# Patient Record
Sex: FEMALE | Race: WHITE | Employment: FULL TIME | URBAN - METROPOLITAN AREA
[De-identification: names, ages, dates, MRNs, and addresses within clinical notes are randomized per-mention and may not be internally consistent; named-entity substitution may affect disease eponyms.]

---

## 2017-01-23 ENCOUNTER — HOSPITAL ENCOUNTER (OUTPATIENT)
Dept: LAB | Age: 47
Discharge: HOME | End: 2017-01-23
Payer: COMMERCIAL

## 2017-01-23 ENCOUNTER — OFFICE VISIT (OUTPATIENT)
Dept: FAMILY MEDICINE CLINIC | Age: 47
End: 2017-01-23

## 2017-01-23 VITALS
TEMPERATURE: 96.6 F | HEIGHT: 65 IN | RESPIRATION RATE: 18 BRPM | OXYGEN SATURATION: 97 % | WEIGHT: 293 LBS | SYSTOLIC BLOOD PRESSURE: 160 MMHG | BODY MASS INDEX: 48.82 KG/M2 | DIASTOLIC BLOOD PRESSURE: 92 MMHG | HEART RATE: 75 BPM

## 2017-01-23 DIAGNOSIS — F41.1 GENERALIZED ANXIETY DISORDER: ICD-10-CM

## 2017-01-23 DIAGNOSIS — I10 HTN (HYPERTENSION), BENIGN: Primary | ICD-10-CM

## 2017-01-23 DIAGNOSIS — D22.9 ATYPICAL MOLE: ICD-10-CM

## 2017-01-23 LAB
AMPHET UR QL SCN: NEGATIVE
BARBITURATES UR QL SCN: NEGATIVE
BENZODIAZ UR QL: NEGATIVE
CANNABINOIDS UR QL SCN: NEGATIVE
COCAINE UR QL SCN: NEGATIVE
HDSCOM,HDSCOM: NORMAL
METHADONE UR QL: NEGATIVE
OPIATES UR QL: NEGATIVE
PCP UR QL: NEGATIVE

## 2017-01-23 PROCEDURE — 80307 DRUG TEST PRSMV CHEM ANLYZR: CPT | Performed by: FAMILY MEDICINE

## 2017-01-23 RX ORDER — IBUPROFEN 800 MG/1
800 TABLET ORAL
Qty: 60 TAB | Refills: 0 | Status: SHIPPED | OUTPATIENT
Start: 2017-01-23 | End: 2017-05-24 | Stop reason: SDUPTHER

## 2017-01-23 RX ORDER — LORAZEPAM 1 MG/1
1 TABLET ORAL DAILY
Qty: 60 TAB | Refills: 0 | Status: SHIPPED | OUTPATIENT
Start: 2017-01-23 | End: 2017-10-30 | Stop reason: SDUPTHER

## 2017-01-23 NOTE — PROGRESS NOTES
HISTORY OF PRESENT ILLNESS  Mortimer Fish is a 40 y.o. female. HPI Comments: Patient is here for a follow up on her hypertension. She has been taking her  Blood pressure medications and they are working well for her. She also mentions the level of stress  Has increased at work and she used to take lorazepam last year and as needed. She has brought in an old bottle from last year and she needs a refill today. She would also like a referral for a skin check due to having 3 moles. Hypertension    The history is provided by the patient. This is a chronic problem. The problem has not changed since onset. Pertinent negatives include no chest pain, no orthopnea, no palpitations, no anxiety, no confusion, no malaise/fatigue, no blurred vision, no headaches, no neck pain, no peripheral edema, no dizziness, no shortness of breath and no vomiting. Risk factors include hypertension, stress and family history. Skin Problem   The history is provided by the patient. This is a chronic problem. The problem occurs constantly. The problem has been gradually worsening. Pertinent negatives include no chest pain, no abdominal pain, no headaches and no shortness of breath. Nothing aggravates the symptoms. Nothing relieves the symptoms. She has tried nothing for the symptoms. Anxiety   The history is provided by the patient. This is a chronic problem. The problem occurs constantly. The problem has been gradually worsening. Pertinent negatives include no chest pain, no abdominal pain, no headaches and no shortness of breath. The symptoms are aggravated by stress. Nothing relieves the symptoms. Treatments tried: lorazepam. The treatment provided significant relief. Review of Systems   Constitutional: Negative for chills, fever and malaise/fatigue. HENT: Negative for congestion, ear discharge, ear pain and sore throat. Eyes: Negative for blurred vision, double vision, pain and discharge.    Respiratory: Negative for shortness of breath. Cardiovascular: Negative for chest pain, palpitations, orthopnea and leg swelling. Gastrointestinal: Negative for abdominal pain and vomiting. Musculoskeletal: Negative for joint pain and neck pain. Skin:        3 distinct moles   Neurological: Negative for dizziness and headaches. Psychiatric/Behavioral: Negative for confusion, depression, hallucinations, substance abuse and suicidal ideas. The patient is nervous/anxious. Visit Vitals    BP (!) 160/92    Pulse 75    Temp 96.6 °F (35.9 °C) (Oral)    Resp 18    Ht 5' 5\" (1.651 m)    Wt 302 lb (137 kg)    SpO2 97%    BMI 50.26 kg/m2       Physical Exam   Constitutional: She is oriented to person, place, and time. She appears well-developed and well-nourished. No distress. HENT:   Head: Normocephalic and atraumatic. Right Ear: External ear normal.   Left Ear: External ear normal.   Mouth/Throat: Oropharynx is clear and moist.   Eyes: EOM are normal. Pupils are equal, round, and reactive to light. No scleral icterus. Neck: Normal range of motion. No thyromegaly present. Cardiovascular: Normal rate and normal heart sounds. Pulmonary/Chest: Effort normal and breath sounds normal. No respiratory distress. She has no wheezes. Abdominal: Soft. Bowel sounds are normal. She exhibits no distension. There is no tenderness. Lymphadenopathy:     She has no cervical adenopathy. Neurological: She is alert and oriented to person, place, and time. Skin:   One raised mole on the right side of neck which is raised and discolored. 2 moles on the left side of stomach under the breast which are raised and discolored. Psychiatric: She has a normal mood and affect. ASSESSMENT and PLAN  Hypertension :  1) Goal blood pressure less than equal to 140/90 mmHg, goal BP can vary depending on risk factors as discussed.   2) Lifestyle modifications discussed with patient, low sodium <2 gm, low salt , DASH diet  3) Exercise for at least 30 min 3-5 times a week for goal BMI of less than or equal  To 25.  4) Continue current medications as prescribed. 5) Please begin medication as discussed for better blood pressure control, explained side effects and patient verbalized understanding. 6) Goal LDL<100.  7) Please monitor your blood pressure and keep a log to bring in with you at each visit. 8) Discussed risk factors with patient such as CAD, FAST of stroke symptoms, pt verbalizes understanding. 9) Please avoid smoking , alcohol and any illicit drug use if applicable to you. Anxiety:  1) Please do not take more xanax than the reccommended dose, explained side effects and patient verbalized understanding. 2)  registry consulted. 3) Urine drug screen ordered.     Moles :  - referral to dermatology has been made

## 2017-01-23 NOTE — PROGRESS NOTES
Chief Complaint   Patient presents with    Hypertension     follow up     . Demetria Sanderson 1. Have you been to the ER, urgent care clinic since your last visit? Hospitalized since your last visit? No    2. Have you seen or consulted any other health care providers outside of the [facility] since your last visit? Include any pap smears or colon screening.  No

## 2017-03-15 RX ORDER — METOPROLOL TARTRATE 50 MG/1
TABLET ORAL
Qty: 90 TAB | Refills: 0 | Status: SHIPPED | OUTPATIENT
Start: 2017-03-15 | End: 2017-08-30 | Stop reason: SDUPTHER

## 2017-03-15 NOTE — TELEPHONE ENCOUNTER
Requested Prescriptions     Pending Prescriptions Disp Refills    escitalopram oxalate (LEXAPRO) 20 mg tablet 30 Tab 3     Sig: Take 1 Tab by mouth daily. [employer] already in system.

## 2017-03-17 RX ORDER — ESCITALOPRAM OXALATE 20 MG/1
TABLET ORAL
Qty: 30 TAB | Refills: 2 | Status: SHIPPED | OUTPATIENT
Start: 2017-03-17 | End: 2017-05-24

## 2017-03-17 NOTE — TELEPHONE ENCOUNTER
Requested Prescriptions     Pending Prescriptions Disp Refills    escitalopram oxalate (LEXAPRO) 20 mg tablet [Pharmacy Med Name: ESCITALOPRAM 20 MG TABLET] 30 Tab 2     Sig: TAKE ONE TABLET BY MOUTH DAILY     Pt states called pharmacy for refill a couple of days ago. States does not have enough to get through the weekend.

## 2017-03-21 RX ORDER — ESCITALOPRAM OXALATE 20 MG/1
20 TABLET ORAL DAILY
Qty: 30 TAB | Refills: 3 | Status: SHIPPED | OUTPATIENT
Start: 2017-03-21 | End: 2017-10-12 | Stop reason: SDUPTHER

## 2017-05-24 ENCOUNTER — OFFICE VISIT (OUTPATIENT)
Dept: FAMILY MEDICINE CLINIC | Age: 47
End: 2017-05-24

## 2017-05-24 VITALS
HEIGHT: 65 IN | RESPIRATION RATE: 17 BRPM | WEIGHT: 293 LBS | SYSTOLIC BLOOD PRESSURE: 129 MMHG | TEMPERATURE: 98.8 F | DIASTOLIC BLOOD PRESSURE: 75 MMHG | BODY MASS INDEX: 48.82 KG/M2 | HEART RATE: 68 BPM | OXYGEN SATURATION: 96 %

## 2017-05-24 DIAGNOSIS — F41.1 GENERALIZED ANXIETY DISORDER: ICD-10-CM

## 2017-05-24 DIAGNOSIS — J01.00 ACUTE NON-RECURRENT MAXILLARY SINUSITIS: Primary | ICD-10-CM

## 2017-05-24 RX ORDER — BENZONATATE 100 MG/1
100 CAPSULE ORAL
Qty: 20 CAP | Refills: 0 | Status: SHIPPED | OUTPATIENT
Start: 2017-05-24 | End: 2017-05-31

## 2017-05-24 RX ORDER — IBUPROFEN 800 MG/1
800 TABLET ORAL
Qty: 60 TAB | Refills: 0 | Status: SHIPPED | OUTPATIENT
Start: 2017-05-24 | End: 2017-10-30 | Stop reason: SDUPTHER

## 2017-05-24 RX ORDER — LORAZEPAM 1 MG/1
1 TABLET ORAL DAILY
Qty: 60 TAB | Refills: 0 | Status: CANCELLED | OUTPATIENT
Start: 2017-05-24

## 2017-05-24 RX ORDER — GUAIFENESIN 600 MG/1
600 TABLET, EXTENDED RELEASE ORAL
Qty: 15 TAB | Refills: 0 | Status: SHIPPED | OUTPATIENT
Start: 2017-05-24 | End: 2017-06-30

## 2017-05-24 NOTE — PROGRESS NOTES
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 Yesterday morning developed post nasal drip, runny nose yellow, sinus congestion, productive cough,inflammed throat, uncomfortable swallowing, minimal sinus congestion. Ear fullness left started last night. Some watery eyes yesterday but none today. Possible fever yesterday, sweating. Chest tightness with deep breathing and some wheezing. Minimal shortness of breath. No GI symptoms including abdominal pain, nausea, vomiting or diarrhea. No  symptoms including dysuria, hematuria. Sick contacts at work. ROS: as per HPI. Past Medical Hx, Family Hx, Social Hx, Surgical Hx, Medications, Allergies: All reviewed and updated in EMR as appropriate. Current Outpatient Prescriptions   Medication Sig    ibuprofen (MOTRIN) 800 mg tablet Take 1 Tab by mouth every twelve (12) hours as needed for Pain.  benzonatate (TESSALON) 100 mg capsule Take 1 Cap by mouth three (3) times daily as needed for Cough for up to 7 days.  guaiFENesin ER (MUCINEX) 600 mg ER tablet Take 1 Tab by mouth two (2) times daily as needed for Congestion.  inhalational spacing device 1 Each by Does Not Apply route as needed.  escitalopram oxalate (LEXAPRO) 20 mg tablet Take 1 Tab by mouth daily.  metoprolol tartrate (LOPRESSOR) 50 mg tablet TAKE ONE TABLET BY MOUTH DAILY *GENERIC FOR LOPRESSOR*    LORazepam (ATIVAN) 1 mg tablet Take 1 Tab by mouth daily. Max Daily Amount: 1 mg.  lisinopril (PRINIVIL, ZESTRIL) 20 mg tablet Take  by mouth daily.  ferrous sulfate (IRON) 325 mg (65 mg iron) EC tablet Take 1 Tab by mouth two (2) times a day. No current facility-administered medications for this visit.         OBJECTIVE:  Vitals:    05/24/17 0953   BP: 129/75   Pulse: 68   Resp: 17   Temp: 98.8 °F (37.1 °C)   TempSrc: Oral   SpO2: 96%   Weight: 307 lb (139.3 kg)   Height: 5' 5\" (1.651 m)   General: Pleasant adult woman in mild distress  HEENT:    Head: atraumatic normo-cephalic. Eyes: Pupils equally round and reactive to light, extraocular muscle intact, conjunctiva clear, non-icterus. Ears: bilaterally normal TM, no erythema or exudate, normal light reflex. Nose: Nares bilaterally mucosal membranes mild erythema. Nasal turbinates hypertrophied and erythematous. clear nasal discharge. Sinuses: TTP left maxillary sinuses   Mouth: cobble stoning oropharynx  Neck: Supple, left superior cervical LAD  CVS:  Heart regular, rate, and rhythm. Audible S1 and S2. No murmurs, rubs or gallops. Peripheral pulses 2+, no LE edema. PULM: Lungs clear to auscultation bilaterally. No wheezes, rales or rhonchi. Neuro: Alert and oriented to person, place, time and situation. ASSESSMENT AND PLAN:    ICD-10-CM ICD-9-CM    1. Acute non-recurrent maxillary sinusitis J01.00 461.0 -- ibuprofen (MOTRIN) 800 mg tablet      -- benzonatate (TESSALON) 100 mg capsule      -- guaiFENesin ER (MUCINEX) 600 mg ER tablet  -- Pt to call if no sx improvement in 5 days for Rx for augmentin 875/125 BID for 7 days with food      -- inhalational spacing device with albuterol   2. Generalized anxiety disorder F41.1 300.02 -- msg sent to PCP for ativan refill     3. Orders Placed This Encounter    ibuprofen (MOTRIN) 800 mg tablet    benzonatate (TESSALON) 100 mg capsule    guaiFENesin ER (MUCINEX) 600 mg ER tablet    inhalational spacing device     4. After visit summary provided to patient    5. Assessment and plan above discussed with patient, patient voiced understanding and agreement with plan. Paul Galvez M.D.   [address], [address] [place] [address], [address] [place] [address] [phone]  [facility] [phone]

## 2017-05-24 NOTE — LETTER
NOTIFICATION RETURN TO WORK / SCHOOL 
 
5/24/2017 10:20 AM 
 
Ms. Rashi Pickens [address] [address] To Whom It May Concern: 
 
Rashi Pickens is currently under the care of [facility]. She has been ill and will return to work on: 5/26/2017 If there are questions or concerns please have the patient contact our office. Sincerely, Junior Nika MD

## 2017-05-24 NOTE — PATIENT INSTRUCTIONS
ACUTE SINUSITIS    Start taking mucinex as needed twice a day for congestoin  Try to use netti-pot or other sinus saline rinse once a day to clear out nasal/sinus congestion, available at local pharmacy  Tessalon by mouth as needed three times a day for coughing  Okay to use ibuprofen as needed for pain   This is most likely a viral infection. If your symptoms do not improve over the next 5 days or get worse by 5 days in call clinic for antibiotic prescription  Use your albuterol inhaler WITH spacer as needed for wheezing, shortness of breath or chest tightness      We will message Dr. John Gordon for your ativan prescription refill           Sinusitis: Care Instructions  Your Care Instructions    Sinusitis is an infection of the lining of the sinus cavities in your head. Sinusitis often follows a cold. It causes pain and pressure in your head and face. In most cases, sinusitis gets better on its own in 1 to 2 weeks. But some mild symptoms may last for several weeks. Sometimes antibiotics are needed. Follow-up care is a key part of your treatment and safety. Be sure to make and go to all appointments, and call your doctor if you are having problems. It's also a good idea to know your test results and keep a list of the medicines you take. How can you care for yourself at home? · Take an over-the-counter pain medicine, such as acetaminophen (Tylenol), ibuprofen (Advil, Motrin), or naproxen (Aleve). Read and follow all instructions on the label. · If the doctor prescribed antibiotics, take them as directed. Do not stop taking them just because you feel better. You need to take the full course of antibiotics. · Be careful when taking over-the-counter cold or flu medicines and Tylenol at the same time. Many of these medicines have acetaminophen, which is Tylenol. Read the labels to make sure that you are not taking more than the recommended dose. Too much acetaminophen (Tylenol) can be harmful.   · Breathe warm, moist air from a steamy shower, a hot bath, or a sink filled with hot water. Avoid cold, dry air. Using a humidifier in your home may help. Follow the directions for cleaning the machine. · Use saline (saltwater) nasal washes to help keep your nasal passages open and wash out mucus and bacteria. You can buy saline nose drops at a grocery store or drugstore. Or you can make your own at home by adding 1 teaspoon of salt and 1 teaspoon of baking soda to 2 cups of distilled water. If you make your own, fill a bulb syringe with the solution, insert the tip into your nostril, and squeeze gently. Monico [place] your nose. · Put a hot, wet towel or a warm gel pack on your face 3 or 4 times a day for 5 to 10 minutes each time. · Try a decongestant nasal spray like oxymetazoline (Afrin). Do not use it for more than 3 days in a row. Using it for more than 3 days can make your congestion worse. When should you call for help? Call your doctor now or seek immediate medical care if:  · You have new or worse swelling or redness in your face or around your eyes. · You have a new or higher fever. Watch closely for changes in your health, and be sure to contact your doctor if:  · You have new or worse facial pain. · The mucus from your nose becomes thicker (like pus) or has new blood in it. · You are not getting better as expected. Where can you learn more? Go to http://tommie-jaxon.info/. Enter H610 in the search box to learn more about \"Sinusitis: Care Instructions. \"  Current as of: July 29, 2016  Content Version: 11.2  © 3035-7468 [employer]. Care instructions adapted under license by [employer] (which disclaims liability or warranty for this information). If you have questions about a medical condition or this instruction, always ask your healthcare professional. [address] any warranty or liability for your use of this information.

## 2017-05-24 NOTE — MR AVS SNAPSHOT
Visit Information Date & Time Provider Department Dept. Phone Encounter #  
 5/24/2017 10:00 AM Colton Mackey MD [facility] [phone] [id number] Follow-up Instructions Return if symptoms worsen or fail to improve. Upcoming Health Maintenance Date Due INFLUENZA AGE 9 TO ADULT 8/1/2017 PAP AKA CERVICAL CYTOLOGY 12/19/2019 DTaP/Tdap/Td series (2 - Td) 12/19/2026 Allergies as of 5/24/2017  Review Complete On: 5/24/2017 By: Colton Mackey MD  
  
 Severity Noted Reaction Type Reactions Darvocet A500 [Propoxyphene N-acetaminophen] High 03/25/2016    Itching Percocet [Oxycodone-acetaminophen] High 03/25/2016    Itching Current Immunizations  Never Reviewed No immunizations on file. Not reviewed this visit You Were Diagnosed With   
  
 Codes Comments Acute non-recurrent maxillary sinusitis    -  Primary ICD-10-CM: J01.00 ICD-9-CM: 461.0 Vitals BP Pulse Temp Resp Height(growth percentile) Weight(growth percentile) 129/75 (BP 1 Location: Left arm, BP Patient Position: Sitting) 68 98.8 °F (37.1 °C) (Oral) 17 5' 5\" (1.651 m) 307 lb (139.3 kg) SpO2 BMI OB Status Smoking Status 96% 51.09 kg/m2 Hysterectomy Former Smoker BMI and BSA Data Body Mass Index Body Surface Area 51.09 kg/m 2 2.53 m 2 Preferred Pharmacy Pharmacy Name Phone [facility] #[id number] [place] [phone] Your Updated Medication List  
  
   
This list is accurate as of: 5/24/17 10:17 AM.  Always use your most recent med list.  
  
  
  
  
 benzonatate 100 mg capsule Commonly known as:  TESSALON Take 1 Cap by mouth three (3) times daily as needed for Cough for up to 7 days. escitalopram oxalate 20 mg tablet Commonly known as:  Salli Becerra Take 1 Tab by mouth daily. ferrous sulfate 325 mg (65 mg iron) EC tablet Commonly known as:  IRON  
 Take 1 Tab by mouth two (2) times a day. guaiFENesin  mg ER tablet Commonly known as:  Jan & Jan Take 1 Tab by mouth two (2) times daily as needed for Congestion. ibuprofen 800 mg tablet Commonly known as:  MOTRIN Take 1 Tab by mouth every twelve (12) hours as needed for Pain.  
  
 inhalational spacing device 1 Each by Does Not Apply route as needed. lisinopril 20 mg tablet Commonly known as:  Salvadore Dine Take  by mouth daily. LORazepam 1 mg tablet Commonly known as:  ATIVAN Take 1 Tab by mouth daily. Max Daily Amount: 1 mg.  
  
 metoprolol tartrate 50 mg tablet Commonly known as:  LOPRESSOR  
TAKE ONE TABLET BY MOUTH DAILY *GENERIC FOR LOPRESSOR* Prescriptions Sent to Pharmacy Refills  
 ibuprofen (MOTRIN) 800 mg tablet 0 Sig: Take 1 Tab by mouth every twelve (12) hours as needed for Pain. Class: Normal  
 Pharmacy: [facility] #[id number] [address] Ph #: [phone] Route: Oral  
 benzonatate (TESSALON) 100 mg capsule 0 Sig: Take 1 Cap by mouth three (3) times daily as needed for Cough for up to 7 days. Class: Normal  
 Pharmacy: [facility] #[id number] [address] Ph #: [phone] Route: Oral  
 guaiFENesin ER (MUCINEX) 600 mg ER tablet 0 Sig: Take 1 Tab by mouth two (2) times daily as needed for Congestion. Class: Normal  
 Pharmacy: [facility] #[id number] [address] Ph #: [phone] Route: Oral  
 inhalational spacing device 0 Si Each by Does Not Apply route as needed. Class: Normal  
 Pharmacy: [facility] #[id number] [address] Ph #: [phone] Route: Does Not Apply Follow-up Instructions Return if symptoms worsen or fail to improve. Patient Instructions ACUTE SINUSITIS Start taking mucinex as needed twice a day for congestoin Try to use netti-pot or other sinus saline rinse once a day to clear out nasal/sinus congestion, available at local pharmacy Tessalon by mouth as needed three times a day for coughing Okay to use ibuprofen as needed for pain This is most likely a viral infection. If your symptoms do not improve over the next 5 days or get worse by 5 days in call clinic for antibiotic prescription Use your albuterol inhaler WITH spacer as needed for wheezing, shortness of breath or chest tightness We will message Dr. Iveth Augustin for your ativan prescription refill Sinusitis: Care Instructions Your Care Instructions Sinusitis is an infection of the lining of the sinus cavities in your head. Sinusitis often follows a cold. It causes pain and pressure in your head and face. In most cases, sinusitis gets better on its own in 1 to 2 weeks. But some mild symptoms may last for several weeks. Sometimes antibiotics are needed. Follow-up care is a key part of your treatment and safety. Be sure to make and go to all appointments, and call your doctor if you are having problems. It's also a good idea to know your test results and keep a list of the medicines you take. How can you care for yourself at home? · Take an over-the-counter pain medicine, such as acetaminophen (Tylenol), ibuprofen (Advil, Motrin), or naproxen (Aleve). Read and follow all instructions on the label. · If the doctor prescribed antibiotics, take them as directed. Do not stop taking them just because you feel better. You need to take the full course of antibiotics. · Be careful when taking over-the-counter cold or flu medicines and Tylenol at the same time. Many of these medicines have acetaminophen, which is Tylenol. Read the labels to make sure that you are not taking more than the recommended dose. Too much acetaminophen (Tylenol) can be harmful.  
· Breathe warm, moist air from a steamy shower, a hot bath, or a sink filled with hot water. Avoid cold, dry air. Using a humidifier in your home may help. Follow the directions for cleaning the machine. · Use saline (saltwater) nasal washes to help keep your nasal passages open and wash out mucus and bacteria. You can buy saline nose drops at a grocery store or drugstore. Or you can make your own at home by adding 1 teaspoon of salt and 1 teaspoon of baking soda to 2 cups of distilled water. If you make your own, fill a bulb syringe with the solution, insert the tip into your nostril, and squeeze gently. Yen Chyle your nose. · Put a hot, wet towel or a warm gel pack on your face 3 or 4 times a day for 5 to 10 minutes each time. · Try a decongestant nasal spray like oxymetazoline (Afrin). Do not use it for more than 3 days in a row. Using it for more than 3 days can make your congestion worse. When should you call for help? Call your doctor now or seek immediate medical care if: 
· You have new or worse swelling or redness in your face or around your eyes. · You have a new or higher fever. Watch closely for changes in your health, and be sure to contact your doctor if: 
· You have new or worse facial pain. · The mucus from your nose becomes thicker (like pus) or has new blood in it. · You are not getting better as expected. Where can you learn more? Go to http://tommie-jaxon.info/. Enter R643 in the search box to learn more about \"Sinusitis: Care Instructions. \" Current as of: July 29, 2016 Content Version: 11.2 © 4531-2081 [employer]. Care instructions adapted under license by [employer] (which disclaims liability or warranty for this information). If you have questions about a medical condition or this instruction, always ask your healthcare professional. [address] any warranty or liability for your use of this information. Introducing [facility]! Prateek Andrews introduces [employer] patient portal. Now you can access parts of your medical record, email your doctor's office, and request medication refills online. 1. In your internet browser, go to [email or web address] 2. Click on the First Time User? Click Here link in the Sign In box. You will see the New Member Sign Up page. 3. Enter your [employer] Access Code exactly as it appears below. You will not need to use this code after youve completed the sign-up process. If you do not sign up before the expiration date, you must request a new code. · [employer] Access Code: [email or web address] Expires: 8/22/2017 10:17 AM 
 
4. Enter the last four digits of your Social Security Number (xxxx) and Date of Birth (mm/dd/yyyy) as indicated and click Submit. You will be taken to the next sign-up page. 5. Create a [employer] ID. This will be your [employer] login ID and cannot be changed, so think of one that is secure and easy to remember. 6. Create a [employer] password. You can change your password at any time. 7. Enter your Password Reset Question and Answer. This can be used at a later time if you forget your password. 8. Enter your e-mail address. You will receive e-mail notification when new information is available in [address] 9. Click Sign Up. You can now view and download portions of your medical record. 10. Click the Download Summary menu link to download a portable copy of your medical information. If you have questions, please visit the Frequently Asked Questions section of the [employer] website. Remember, [employer] is NOT to be used for urgent needs. For medical emergencies, dial 911. Now available from your iPhone and Android! Please provide this summary of care documentation to your next provider. Your primary care clinician is listed as Samira [facility]. If you have any questions after today's visit, please call [phone].

## 2017-05-24 NOTE — PROGRESS NOTES
Sofi Martinez is a 40 y.o. female presents to office for coughing and left ear feels full for about a day. 1. Have you been to the ER, urgent care clinic or hospitalized since your last visit? no          Health Maintenance items with a due date reviewed with patient:  There are no preventive care reminders to display for this patient.

## 2017-05-30 ENCOUNTER — TELEPHONE (OUTPATIENT)
Dept: FAMILY MEDICINE CLINIC | Age: 47
End: 2017-05-30

## 2017-05-30 DIAGNOSIS — J01.90 ACUTE BACTERIAL SINUSITIS: Primary | ICD-10-CM

## 2017-05-30 DIAGNOSIS — B96.89 ACUTE BACTERIAL SINUSITIS: Primary | ICD-10-CM

## 2017-05-30 RX ORDER — AMOXICILLIN AND CLAVULANATE POTASSIUM 875; 125 MG/1; MG/1
1 TABLET, FILM COATED ORAL EVERY 12 HOURS
Qty: 14 TAB | Refills: 0 | Status: SHIPPED | OUTPATIENT
Start: 2017-05-30 | End: 2017-06-06

## 2017-05-30 NOTE — TELEPHONE ENCOUNTER
Called patient to discuss symptoms. No answer and unable to leave message on voicemail. Awaiting call back. When patient calls back please direct to MALGORZATA Wall to inform patient that augmentin was prescribed to take for 7 days as written    Acute Bacterial Sinusitis: No improvement after 5 days  -- Augmentin 875/125 mg BID x 7 days sent to [facility] on file  -- Return if symptoms do not improve with the above      Celester Narendra PATEL   [employer]  [address] [place] [address], [address] [place] [address] 066 [address] [place] [address] [phone]

## 2017-06-30 ENCOUNTER — OFFICE VISIT (OUTPATIENT)
Dept: FAMILY MEDICINE CLINIC | Age: 47
End: 2017-06-30

## 2017-06-30 VITALS
HEART RATE: 75 BPM | TEMPERATURE: 95.8 F | SYSTOLIC BLOOD PRESSURE: 161 MMHG | WEIGHT: 293 LBS | OXYGEN SATURATION: 95 % | DIASTOLIC BLOOD PRESSURE: 95 MMHG | RESPIRATION RATE: 16 BRPM | HEIGHT: 65 IN | BODY MASS INDEX: 48.82 KG/M2

## 2017-06-30 DIAGNOSIS — I10 HTN (HYPERTENSION), BENIGN: ICD-10-CM

## 2017-06-30 DIAGNOSIS — F41.1 GENERALIZED ANXIETY DISORDER: Primary | ICD-10-CM

## 2017-06-30 DIAGNOSIS — F41.0 PANIC ATTACKS: ICD-10-CM

## 2017-06-30 RX ORDER — LORAZEPAM 1 MG/1
1 TABLET ORAL
Qty: 15 TAB | Refills: 0 | Status: SHIPPED | OUTPATIENT
Start: 2017-06-30 | End: 2017-10-30 | Stop reason: SDUPTHER

## 2017-06-30 NOTE — MR AVS SNAPSHOT
Visit Information Date & Time Provider Department Dept. Phone Encounter #  
 6/30/2017  4:15 PM Jackquelyn Mcburney, MD [address] [phone] [id number] Upcoming Health Maintenance Date Due INFLUENZA AGE 9 TO ADULT 8/1/2017 PAP AKA CERVICAL CYTOLOGY 12/19/2019 DTaP/Tdap/Td series (2 - Td) 12/19/2026 Allergies as of 6/30/2017  Review Complete On: 6/30/2017 By: Neil Willis LPN Severity Noted Reaction Type Reactions Darvocet A500 [Propoxyphene N-acetaminophen] High 03/25/2016    Itching Percocet [Oxycodone-acetaminophen] High 03/25/2016    Itching Current Immunizations  Never Reviewed No immunizations on file. Not reviewed this visit You Were Diagnosed With   
  
 Codes Comments Generalized anxiety disorder    -  Primary ICD-10-CM: F41.1 ICD-9-CM: 300.02 Panic attacks     ICD-10-CM: F41.0 ICD-9-CM: 300.01 Vitals BP Pulse Temp Resp Height(growth percentile) Weight(growth percentile) (!) 161/95 75 95.8 °F (35.4 °C) 16 5' 5\" (1.651 m) 309 lb (140.2 kg) SpO2 BMI OB Status Smoking Status 95% 51.42 kg/m2 Hysterectomy Former Smoker Vitals History BMI and BSA Data Body Mass Index Body Surface Area  
 51.42 kg/m 2 2.54 m 2 Preferred Pharmacy Pharmacy Name Phone [facility] #[id number] Bailee Kaur, [address] [place] [address] [phone] Your Updated Medication List  
  
   
This list is accurate as of: 6/30/17  4:29 PM.  Always use your most recent med list.  
  
  
  
  
 escitalopram oxalate 20 mg tablet Commonly known as:  Gavin Hane Take 1 Tab by mouth daily. ferrous sulfate 325 mg (65 mg iron) EC tablet Commonly known as:  IRON Take 1 Tab by mouth two (2) times a day. ibuprofen 800 mg tablet Commonly known as:  MOTRIN Take 1 Tab by mouth every twelve (12) hours as needed for Pain. lisinopril 20 mg tablet Commonly known as:  Flaca Barrier Take  by mouth daily. * LORazepam 1 mg tablet Commonly known as:  ATIVAN Take 1 Tab by mouth daily. Max Daily Amount: 1 mg.  
  
 * LORazepam 1 mg tablet Commonly known as:  ATIVAN Take 1 Tab by mouth daily as needed for Anxiety. metoprolol tartrate 50 mg tablet Commonly known as:  LOPRESSOR  
TAKE ONE TABLET BY MOUTH DAILY *GENERIC FOR LOPRESSOR*  
  
 * Notice: This list has 2 medication(s) that are the same as other medications prescribed for you. Read the directions carefully, and ask your doctor or other care provider to review them with you. Prescriptions Printed Refills LORazepam (ATIVAN) 1 mg tablet 0 Sig: Take 1 Tab by mouth daily as needed for Anxiety. Class: Print Route: Oral  
  
Patient Instructions Take ativan as needed for panic Consider getting \"when panic attacks\" by Dr. Eduardo Black as an introduction to CBT (cognitive behavioral therapy) If this fits with your symptoms please strongly consider starting CBT therapy for anxiety Introducing [facility] SERVICES! Mehdi Recinos introduces [employer] patient portal. Now you can access parts of your medical record, email your doctor's office, and request medication refills online. 1. In your internet browser, go to [email or web address] 2. Click on the First Time User? Click Here link in the Sign In box. You will see the New Member Sign Up page. 3. Enter your [employer] Access Code exactly as it appears below. You will not need to use this code after youve completed the sign-up process. If you do not sign up before the expiration date, you must request a new code. · [employer] Access Code: [email or web address] Expires: 8/22/2017 10:17 AM 
 
4. Enter the last four digits of your Social Security Number (xxxx) and Date of Birth (mm/dd/yyyy) as indicated and click Submit. You will be taken to the next sign-up page. 5. Create a [employer] ID. This will be your [employer] login ID and cannot be changed, so think of one that is secure and easy to remember. 6. Create a [employer] password. You can change your password at any time. 7. Enter your Password Reset Question and Answer. This can be used at a later time if you forget your password. 8. Enter your e-mail address. You will receive e-mail notification when new information is available in [address] 9. Click Sign Up. You can now view and download portions of your medical record. 10. Click the Download Summary menu link to download a portable copy of your medical information. If you have questions, please visit the Frequently Asked Questions section of the [employer] website. Remember, [employer] is NOT to be used for urgent needs. For medical emergencies, dial 911. Now available from your iPhone and Android! Please provide this summary of care documentation to your next provider. Your primary care clinician is listed as Samira Gordon. If you have any questions after today's visit, please call [phone].

## 2017-06-30 NOTE — PATIENT INSTRUCTIONS
Take ativan as needed for panic  Consider getting \"when panic attacks\" by Dr. Margaretha Dubin as an introduction to CBT (cognitive behavioral therapy)  If this fits with your symptoms please strongly consider starting CBT therapy for anxiety

## 2017-06-30 NOTE — PROGRESS NOTES
Internal Medicine Follow Up Visit Note    Chief Complaint   Patient presents with    Anxiety    Medication Refill       HPI:  Lianet Le is a 40 y.o.  female with history significant for HTN and generalized anxiety is here for the above complaint(s). Anxiety: Has a chronic history of anxiety for past 20 years. Has difficulty controlling worry and relaxing. Panic attacks at home typically associated with stress, severe 5x per year. Last panic attack 2 days ago, in past month 2x. Panic includes getting warm, hot, shaking, shortness of breath, instant headaches and paralysis in legs. No intense sense of fear, no desire to escape, no worry that something bad is about to happened, typically lasting 6-7 minutes. Some irritability, restlessness. Medication trials include ativan, nothing else. Years ago had talk therapy in 25s, 2 years once a week talk therapy. Lexapro 20mg daily for past 7 years, helpful with anxiety. Past history of paxil which was helpful, stopped 2/2 MD elizabeth. HTN: No chest pain or shortness of breath. Took Rx this morning lisinopril and metoprolol 50 mg. Encouraged to discuss with PCP regarding Rx. ROS: See HPI      Current Outpatient Prescriptions   Medication Sig    LORazepam (ATIVAN) 1 mg tablet Take 1 Tab by mouth daily as needed for Anxiety.  ibuprofen (MOTRIN) 800 mg tablet Take 1 Tab by mouth every twelve (12) hours as needed for Pain.  escitalopram oxalate (LEXAPRO) 20 mg tablet Take 1 Tab by mouth daily.  metoprolol tartrate (LOPRESSOR) 50 mg tablet TAKE ONE TABLET BY MOUTH DAILY *GENERIC FOR LOPRESSOR*    LORazepam (ATIVAN) 1 mg tablet Take 1 Tab by mouth daily. Max Daily Amount: 1 mg.  ferrous sulfate (IRON) 325 mg (65 mg iron) EC tablet Take 1 Tab by mouth two (2) times a day.  lisinopril (PRINIVIL, ZESTRIL) 20 mg tablet Take  by mouth daily. No current facility-administered medications for this visit.       Health Maintenance   Topic Date Due  INFLUENZA AGE 9 TO ADULT  08/01/2017    PAP AKA CERVICAL CYTOLOGY  12/19/2019    DTaP/Tdap/Td series (2 - Td) 12/19/2026       Allergies and Medications: Reviewed and updated in EMR. Patient Active Problem List    Diagnosis Date Noted    Vitamin D deficiency 12/22/2016    Iron deficiency anemia due to chronic blood loss 12/22/2016    Cyst of right breast 12/19/2016    S/P total hysterectomy 11/21/2016    Advance care planning 03/25/2016    HTN (hypertension), benign 03/25/2016    Obesity 03/25/2016    Generalized anxiety disorder 03/25/2016       Family History, Social History, Past Medical History, Surgical History: Reviewed and updated in EMR as appropriate. OBJECTIVE:   Visit Vitals    BP (!) 161/95    Pulse 75    Temp 95.8 °F (35.4 °C)    Resp 16    Ht 5' 5\" (1.651 m)    Wt 309 lb (140.2 kg)    SpO2 95%  Comment: ra    BMI 51.42 kg/m2   General: Adult woman, in no acute distress  MSE: Appears stated age, dressed casually, good grooming and hygiene. Speech normal volume, rate and tone. Mood anxious with underlying irritability, affect congruent and reactive. TP linear content focused on HPI. Cognition grossly intact, not formally assessed. Fair insight and judgment. No SI/HI. PHQ-9: 5, NOT DIFFICULT  RICHARD-7: 10, SOMEWHAT DIFFICULT    Nursing Notes Reviewed. ASSESSMENT/PLAN:      ICD-10-CM ICD-9-CM    1. Generalized anxiety disorder F41.1 300.02 LORazepam (ATIVAN) 1 mg tablet PRN # 15 tabs  Continue lexapro, consider alternative SSRI  Consider therapy  Encouraged to get \"When Panic Attacks\" by Dr. Melvin Terrazas for introduction to cognitive behavioral therapy  Encouraged to discuss further refills with PCP   2. Panic attacks F41.0 300.01 LORazepam (ATIVAN) 1 mg tablet prn, # 15 tabs   3.  HTN (hypertension), benign I10 401.1 Encouraged to discuss with PCP       Requested Prescriptions     Signed Prescriptions Disp Refills    LORazepam (ATIVAN) 1 mg tablet 15 Tab 0     Sig: Take 1 Tab by mouth daily as needed for Anxiety. Patient verbalized understanding and agreement with the plan. Patient was given an after-visit summary. Follow-up Disposition: Not on File or sooner if worsening symptoms. More than 50% of this 20 min visit was spent counseling the patient face to face about anxiety management      John Woods M.D.   [place] [phone]  ZJ -

## 2017-08-30 RX ORDER — METOPROLOL TARTRATE 50 MG/1
TABLET ORAL
Qty: 90 TAB | Refills: 0 | Status: SHIPPED | OUTPATIENT
Start: 2017-08-30 | End: 2018-03-07 | Stop reason: SDUPTHER

## 2017-10-16 RX ORDER — ESCITALOPRAM OXALATE 20 MG/1
TABLET ORAL
Qty: 30 TAB | Refills: 2 | Status: SHIPPED | OUTPATIENT
Start: 2017-10-16 | End: 2018-01-10 | Stop reason: SDUPTHER

## 2017-10-16 NOTE — TELEPHONE ENCOUNTER
Pt called and said she is very irritated that she has not received her medication. Pt stated the pharmacy has sent numerous fax request and she would like to  today. Pt was advised that the request documented is for last Thursday and contingent upon receipt of request it takes 48 to 72 hours.

## 2017-10-30 ENCOUNTER — OFFICE VISIT (OUTPATIENT)
Dept: FAMILY MEDICINE CLINIC | Age: 47
End: 2017-10-30

## 2017-10-30 VITALS
BODY MASS INDEX: 48.82 KG/M2 | TEMPERATURE: 97.6 F | WEIGHT: 293 LBS | OXYGEN SATURATION: 96 % | DIASTOLIC BLOOD PRESSURE: 97 MMHG | SYSTOLIC BLOOD PRESSURE: 163 MMHG | HEART RATE: 65 BPM | HEIGHT: 65 IN | RESPIRATION RATE: 18 BRPM

## 2017-10-30 DIAGNOSIS — Z76.0 MEDICATION REFILL: Primary | ICD-10-CM

## 2017-10-30 DIAGNOSIS — F41.1 GENERALIZED ANXIETY DISORDER: ICD-10-CM

## 2017-10-30 DIAGNOSIS — J01.00 ACUTE NON-RECURRENT MAXILLARY SINUSITIS: ICD-10-CM

## 2017-10-30 RX ORDER — LORAZEPAM 1 MG/1
1 TABLET ORAL DAILY
Qty: 60 TAB | Refills: 0 | Status: SHIPPED | OUTPATIENT
Start: 2017-10-30 | End: 2019-01-04 | Stop reason: SDUPTHER

## 2017-10-30 RX ORDER — IBUPROFEN 800 MG/1
800 TABLET ORAL
Qty: 60 TAB | Refills: 0 | Status: SHIPPED | OUTPATIENT
Start: 2017-10-30 | End: 2018-03-07 | Stop reason: SDUPTHER

## 2017-10-30 NOTE — PROGRESS NOTES
Chief Complaint   Patient presents with    Medication Refill    Anxiety       Pt preferred language for health care discussion is English. Is someone accompanying this pt? no    Is the patient using any DME equipment during OV? no    Depression Screening:  PHQ over the last two weeks 10/30/2017 5/24/2017 1/23/2017 12/19/2016 11/21/2016 3/25/2016   PHQ Not Done - - - - Medical Reason (indicate in comments) -   Little interest or pleasure in doing things Not at all Not at all Not at all Not at all - Not at all   Feeling down, depressed or hopeless Not at all Not at all Not at all Not at all - Not at all   Total Score PHQ 2 0 0 0 0 - 0       Learning Assessment:  Learning Assessment 1/23/2017 3/25/2016   PRIMARY LEARNER Patient Patient   HIGHEST LEVEL OF EDUCATION - PRIMARY LEARNER  SOME COLLEGE 2 YEARS [place] CAREGIVER No No   PRIMARY LANGUAGE ENGLISH ENGLISH   LEARNER PREFERENCE PRIMARY READING LISTENING   ANSWERED BY self patient   RELATIONSHIP SELF SELF       Abuse Screening:  No flowsheet data found. Health Maintenance reviewed and discussed per provider. Yes    Pt is due for Flu. Please order/place referral if appropriate. Pt currently taking Antiplatelet therapy? no      Advance Directive:  1. Do you have an advance directive in place? Patient Reply:no    2. If not, would you like material regarding how to put one in place? Patient Reply: no    Coordination of Care:  1. Have you been to the ER, urgent care clinic since your last visit? Hospitalized since your last visit? no    2. Have you seen or consulted any other health care providers outside of the [address] [place] [address] since your last visit? Include any pap smears or colon screening.  no

## 2017-10-30 NOTE — PROGRESS NOTES
HISTORY OF PRESENT ILLNESS  Giselle Coto is a 40 y.o. female. Anxiety   The history is provided by the patient. This is a recurrent problem. The problem occurs constantly. The problem has been gradually improving. Pertinent negatives include no chest pain, no abdominal pain, no headaches and no shortness of breath. The symptoms are aggravated by stress. The symptoms are relieved by medications. Treatments tried: ativan. The treatment provided moderate relief. Medication Refill   The history is provided by the patient. Pertinent negatives include no chest pain, no abdominal pain, no headaches and no shortness of breath. The symptoms are aggravated by stress. Nothing relieves the symptoms. She has tried nothing for the symptoms. Review of Systems   Constitutional: Negative for chills, fever and malaise/fatigue. HENT: Negative for ear pain, hearing loss, sinus pain and sore throat. Eyes: Negative for blurred vision, double vision, pain and discharge. Respiratory: Negative for cough, shortness of breath and wheezing. Cardiovascular: Negative for chest pain, palpitations and leg swelling. Gastrointestinal: Negative for abdominal pain, blood in stool, diarrhea, nausea and vomiting. Genitourinary: Negative for dysuria and frequency. Musculoskeletal: Negative for back pain, joint pain and myalgias. Neurological: Negative for tingling, focal weakness, weakness and headaches. Psychiatric/Behavioral: Negative for depression, substance abuse and suicidal ideas. The patient is nervous/anxious. Visit Vitals    BP (!) 163/97    Pulse 65    Temp 97.6 °F (36.4 °C) (Oral)    Resp 18    Ht 5' 5\" (1.651 m)    Wt 305 lb 3.2 oz (138.4 kg)    SpO2 96%    BMI 50.79 kg/m2       Physical Exam   Constitutional: She is oriented to person, place, and time. She appears well-developed and well-nourished. No distress. HENT:   Head: Normocephalic and atraumatic.    Right Ear: External ear normal. Left Ear: External ear normal.   Mouth/Throat: Oropharynx is clear and moist. No oropharyngeal exudate. Eyes: EOM are normal. Pupils are equal, round, and reactive to light. No scleral icterus. Neck: Normal range of motion. No thyromegaly present. Cardiovascular: Normal rate, regular rhythm and normal heart sounds. Pulmonary/Chest: Effort normal and breath sounds normal. No respiratory distress. She has no wheezes. Abdominal: Soft. Bowel sounds are normal. She exhibits no distension. There is no tenderness. Lymphadenopathy:     She has no cervical adenopathy. Neurological: She is alert and oriented to person, place, and time. Psychiatric: She has a normal mood and affect. ASSESSMENT and PLAN  Anxiety :  1) Please do not take more ativan than the reccommended dose, explained side effects and patient verbalized understanding. 2)  registry consulted. Medication refill:   Medications requested have been refilled.

## 2018-01-10 RX ORDER — ESCITALOPRAM OXALATE 20 MG/1
TABLET ORAL
Qty: 30 TAB | Refills: 2 | Status: SHIPPED | OUTPATIENT
Start: 2018-01-10 | End: 2018-03-07 | Stop reason: SDUPTHER

## 2018-01-10 NOTE — TELEPHONE ENCOUNTER
Requested Prescriptions     Pending Prescriptions Disp Refills    escitalopram oxalate (LEXAPRO) 20 mg tablet 30 Tab 2     Pt stated she is on her last tab.

## 2018-02-28 RX ORDER — LISINOPRIL 20 MG/1
TABLET ORAL
Qty: 90 TAB | Refills: 2 | Status: SHIPPED | OUTPATIENT
Start: 2018-02-28 | End: 2019-05-28 | Stop reason: SDUPTHER

## 2018-03-07 ENCOUNTER — OFFICE VISIT (OUTPATIENT)
Dept: FAMILY MEDICINE CLINIC | Age: 48
End: 2018-03-07

## 2018-03-07 ENCOUNTER — HOSPITAL ENCOUNTER (OUTPATIENT)
Dept: LAB | Age: 48
Discharge: HOME | End: 2018-03-07
Payer: COMMERCIAL

## 2018-03-07 VITALS
OXYGEN SATURATION: 98 % | DIASTOLIC BLOOD PRESSURE: 88 MMHG | HEART RATE: 59 BPM | RESPIRATION RATE: 18 BRPM | BODY MASS INDEX: 48.82 KG/M2 | SYSTOLIC BLOOD PRESSURE: 151 MMHG | HEIGHT: 65 IN | WEIGHT: 293 LBS | TEMPERATURE: 97.7 F

## 2018-03-07 DIAGNOSIS — J01.00 ACUTE NON-RECURRENT MAXILLARY SINUSITIS: ICD-10-CM

## 2018-03-07 DIAGNOSIS — I10 HTN (HYPERTENSION), BENIGN: ICD-10-CM

## 2018-03-07 DIAGNOSIS — I10 HTN (HYPERTENSION), BENIGN: Primary | ICD-10-CM

## 2018-03-07 DIAGNOSIS — F41.1 GENERALIZED ANXIETY DISORDER: ICD-10-CM

## 2018-03-07 DIAGNOSIS — Z76.0 MEDICATION REFILL: ICD-10-CM

## 2018-03-07 LAB
ALBUMIN SERPL-MCNC: 3.3 G/DL (ref 3.4–5)
ALBUMIN/GLOB SERPL: 0.9 {RATIO} (ref 0.8–1.7)
ALP SERPL-CCNC: 84 U/L (ref 45–117)
ALT SERPL-CCNC: 15 U/L (ref 13–56)
ANION GAP SERPL CALC-SCNC: 6 MMOL/L (ref 3–18)
AST SERPL-CCNC: 10 U/L (ref 15–37)
BASOPHILS # BLD: 0.1 K/UL (ref 0–0.06)
BASOPHILS NFR BLD: 1 % (ref 0–2)
BILIRUB SERPL-MCNC: 0.6 MG/DL (ref 0.2–1)
BUN SERPL-MCNC: 10 MG/DL (ref 7–18)
BUN/CREAT SERPL: 22 (ref 12–20)
CALCIUM SERPL-MCNC: 8.5 MG/DL (ref 8.5–10.1)
CHLORIDE SERPL-SCNC: 106 MMOL/L (ref 100–108)
CHOLEST SERPL-MCNC: 179 MG/DL
CO2 SERPL-SCNC: 26 MMOL/L (ref 21–32)
CREAT SERPL-MCNC: 0.46 MG/DL (ref 0.6–1.3)
DIFFERENTIAL METHOD BLD: ABNORMAL
EOSINOPHIL # BLD: 0.2 K/UL (ref 0–0.4)
EOSINOPHIL NFR BLD: 2 % (ref 0–5)
ERYTHROCYTE [DISTWIDTH] IN BLOOD BY AUTOMATED COUNT: 19.9 % (ref 11.6–14.5)
GLOBULIN SER CALC-MCNC: 3.5 G/DL (ref 2–4)
GLUCOSE SERPL-MCNC: 77 MG/DL (ref 74–99)
HCT VFR BLD AUTO: 34.9 % (ref 35–45)
HDLC SERPL-MCNC: 59 MG/DL (ref 40–60)
HDLC SERPL: 3 {RATIO} (ref 0–5)
HGB BLD-MCNC: 10.1 G/DL (ref 12–16)
LDLC SERPL CALC-MCNC: 90.6 MG/DL (ref 0–100)
LIPID PROFILE,FLP: NORMAL
LYMPHOCYTES # BLD: 2.2 K/UL (ref 0.9–3.6)
LYMPHOCYTES NFR BLD: 18 % (ref 21–52)
MCH RBC QN AUTO: 21.9 PG (ref 24–34)
MCHC RBC AUTO-ENTMCNC: 28.9 G/DL (ref 31–37)
MCV RBC AUTO: 75.5 FL (ref 74–97)
MONOCYTES # BLD: 1.4 K/UL (ref 0.05–1.2)
MONOCYTES NFR BLD: 12 % (ref 3–10)
NEUTS SEG # BLD: 8.2 K/UL (ref 1.8–8)
NEUTS SEG NFR BLD: 67 % (ref 40–73)
PLATELET # BLD AUTO: 343 K/UL (ref 135–420)
PMV BLD AUTO: 11.4 FL (ref 9.2–11.8)
POTASSIUM SERPL-SCNC: 4.6 MMOL/L (ref 3.5–5.5)
PROT SERPL-MCNC: 6.8 G/DL (ref 6.4–8.2)
RBC # BLD AUTO: 4.62 M/UL (ref 4.2–5.3)
SODIUM SERPL-SCNC: 138 MMOL/L (ref 136–145)
TRIGL SERPL-MCNC: 147 MG/DL (ref ?–150)
TSH SERPL DL<=0.05 MIU/L-ACNC: 2.12 UIU/ML (ref 0.36–3.74)
VLDLC SERPL CALC-MCNC: 29.4 MG/DL
WBC # BLD AUTO: 12.2 K/UL (ref 4.6–13.2)

## 2018-03-07 PROCEDURE — 80061 LIPID PANEL: CPT | Performed by: FAMILY MEDICINE

## 2018-03-07 PROCEDURE — 82306 VITAMIN D 25 HYDROXY: CPT | Performed by: FAMILY MEDICINE

## 2018-03-07 PROCEDURE — 84443 ASSAY THYROID STIM HORMONE: CPT | Performed by: FAMILY MEDICINE

## 2018-03-07 PROCEDURE — 36415 COLL VENOUS BLD VENIPUNCTURE: CPT | Performed by: FAMILY MEDICINE

## 2018-03-07 PROCEDURE — 85025 COMPLETE CBC W/AUTO DIFF WBC: CPT | Performed by: FAMILY MEDICINE

## 2018-03-07 PROCEDURE — 80053 COMPREHEN METABOLIC PANEL: CPT | Performed by: FAMILY MEDICINE

## 2018-03-07 RX ORDER — IBUPROFEN 800 MG/1
800 TABLET ORAL
Qty: 60 TAB | Refills: 0 | Status: SHIPPED | OUTPATIENT
Start: 2018-03-07 | End: 2018-08-06 | Stop reason: SDUPTHER

## 2018-03-07 RX ORDER — ESCITALOPRAM OXALATE 20 MG/1
TABLET ORAL
Qty: 30 TAB | Refills: 3 | Status: SHIPPED | OUTPATIENT
Start: 2018-03-07 | End: 2019-12-31 | Stop reason: SDUPTHER

## 2018-03-07 RX ORDER — METOPROLOL TARTRATE 50 MG/1
50 TABLET ORAL DAILY
Qty: 90 TAB | Refills: 3 | Status: SHIPPED | OUTPATIENT
Start: 2018-03-07

## 2018-03-07 NOTE — PROGRESS NOTES
HISTORY OF PRESENT ILLNESS  Artem Blackmon is a 56 y.o. female. Morbid Obesity   The history is provided by the patient. This is a chronic problem. The problem occurs constantly. The problem has been gradually worsening. Pertinent negatives include no chest pain, no abdominal pain, no headaches and no shortness of breath. The symptoms are aggravated by stress. Nothing relieves the symptoms. She has tried nothing for the symptoms. Hypertension    The history is provided by the patient. This is a chronic problem. The problem has been gradually worsening. Associated symptoms include anxiety. Pertinent negatives include no chest pain, no palpitations, no confusion, no malaise/fatigue, no blurred vision, no headaches, no tinnitus, no neck pain, no dizziness, no shortness of breath, no nausea and no vomiting. Risk factors include obesity, hypertension and stress. Anxiety   The history is provided by the patient. This is a chronic problem. The problem occurs constantly. The problem has not changed since onset. Pertinent negatives include no chest pain, no abdominal pain, no headaches and no shortness of breath. The symptoms are aggravated by stress. The symptoms are relieved by medications. Treatments tried: Jesusita Zimmer / Qiana Pickens. The treatment provided significant relief. Review of Systems   Constitutional: Negative for chills, fever and malaise/fatigue. HENT: Negative for congestion, ear pain, sinus pain, sore throat and tinnitus. Eyes: Negative for blurred vision, photophobia, pain and discharge. Respiratory: Negative for cough, sputum production and shortness of breath. Cardiovascular: Negative for chest pain, palpitations and leg swelling. Gastrointestinal: Negative for abdominal pain, blood in stool, diarrhea, nausea and vomiting. Genitourinary: Negative for dysuria, frequency and hematuria. Musculoskeletal: Negative for joint pain and neck pain.    Neurological: Negative for dizziness, tingling, focal weakness, weakness and headaches. Psychiatric/Behavioral: Positive for depression. Negative for confusion, hallucinations, memory loss, substance abuse and suicidal ideas. The patient is nervous/anxious. The patient does not have insomnia. Visit Vitals    /88    Pulse (!) 59    Temp 97.7 °F (36.5 °C) (Oral)    Resp 18    Ht 5' 5\" (1.651 m)    Wt 305 lb 3.2 oz (138.4 kg)    SpO2 98%    BMI 50.79 kg/m2       Physical Exam   Constitutional: She is oriented to person, place, and time. She appears well-developed and well-nourished. No distress. HENT:   Head: Normocephalic and atraumatic. Right Ear: External ear normal.   Left Ear: External ear normal.   Mouth/Throat: Oropharynx is clear and moist. No oropharyngeal exudate. Eyes: EOM are normal. Pupils are equal, round, and reactive to light. No scleral icterus. Neck: Normal range of motion. No thyromegaly present. Cardiovascular: Normal rate and regular rhythm. Pulmonary/Chest: Effort normal and breath sounds normal. No respiratory distress. She has no wheezes. Abdominal: Soft. Bowel sounds are normal. She exhibits no distension. There is no tenderness. Lymphadenopathy:     She has no cervical adenopathy. Neurological: She is alert and oriented to person, place, and time. Psychiatric: She has a normal mood and affect. ASSESSMENT and PLAN  Morbidly obese:  - Lifestyle modification is necessary to ensure weight loss and this includes diet , exercise and behavioral modification.  - Diet is geared towards balanced low-calorie diets/portion-controlled diets, low fat diet , low carbohydrate diet and mediterranean diet. Start with 1500 kcal diet. -Exercise 30 minutes 3-5 times a week   - Behavioral modification helps in controlling eating behavior  - Consider surgery only if well-informed and motivated, BMI ? 40 kg/m2,have acceptable risk for surgery, failed previous non-surgical weight loss, adults with a BMI ? 28 kg/m2 comorbidities such as severe diabetes, sleep apnea, or joint disease may also be candidates. Hypertension :  1) Goal blood pressure less than equal to 140/90 mmHg, goal BP can vary depending on risk factors as discussed. 2) Lifestyle modifications discussed with patient, low sodium <2 gm, low salt , DASH diet  3) Exercise for at least 30 min 3-5 times a week for goal BMI of less than or equal  To 25.  4) Continue current medications as prescribed. 5) Please begin medication as discussed for better blood pressure control, explained side effects and patient verbalized understanding. 6) Goal LDL<100.  7) Please monitor your blood pressure and keep a log to bring in with you at each visit. 8) Discussed risk factors with patient such as CAD, FAST of stroke symptoms, pt verbalizes understanding. 9) Please avoid smoking , alcohol and any illicit drug use if applicable to you.   10) Discussed lifestyle modifications ,dietary control and BP monitoring at home    Depression /anxiety :  - Please continue medication

## 2018-03-08 LAB — 25(OH)D3 SERPL-MCNC: 9.3 NG/ML (ref 30–100)

## 2018-03-09 ENCOUNTER — TELEPHONE (OUTPATIENT)
Dept: FAMILY MEDICINE CLINIC | Age: 48
End: 2018-03-09

## 2018-03-09 RX ORDER — ERGOCALCIFEROL 1.25 MG/1
50000 CAPSULE ORAL
Qty: 4 CAP | Refills: 2 | Status: SHIPPED | OUTPATIENT
Start: 2018-03-09 | End: 2018-06-07

## 2018-03-09 RX ORDER — FERROUS SULFATE 325(65) MG
325 TABLET, DELAYED RELEASE (ENTERIC COATED) ORAL 2 TIMES DAILY
Qty: 90 TAB | Refills: 3 | Status: SHIPPED | OUTPATIENT
Start: 2018-03-09 | End: 2019-01-04 | Stop reason: SDUPTHER

## 2018-04-10 ENCOUNTER — TELEPHONE (OUTPATIENT)
Dept: FAMILY MEDICINE CLINIC | Age: 48
End: 2018-04-10

## 2018-04-10 NOTE — TELEPHONE ENCOUNTER
Spoke with patient concerning the changing of Dx code and informed her that the code could not be changed as it was part of the provider's diagnosis for her visit. Pt wasn't pleased as she said her insurance wouldn't cover the diagnosis and she was being charged $200. She stated that she would speak with the provider during her next visit.

## 2018-08-06 DIAGNOSIS — J01.00 ACUTE NON-RECURRENT MAXILLARY SINUSITIS: ICD-10-CM

## 2018-08-06 RX ORDER — IBUPROFEN 800 MG/1
TABLET ORAL
Qty: 60 TAB | Refills: 0 | Status: SHIPPED | OUTPATIENT
Start: 2018-08-06 | End: 2018-12-03 | Stop reason: SDUPTHER

## 2018-08-06 RX ORDER — ESCITALOPRAM OXALATE 20 MG/1
TABLET ORAL
Qty: 30 TAB | Refills: 1 | Status: SHIPPED | OUTPATIENT
Start: 2018-08-06 | End: 2019-12-31 | Stop reason: SDUPTHER

## 2018-10-03 RX ORDER — ESCITALOPRAM OXALATE 20 MG/1
TABLET ORAL
Qty: 30 TAB | Refills: 0 | Status: SHIPPED | OUTPATIENT
Start: 2018-10-03 | End: 2019-12-31 | Stop reason: SDUPTHER

## 2018-11-05 RX ORDER — ESCITALOPRAM OXALATE 20 MG/1
TABLET ORAL
Qty: 30 TAB | Refills: 0 | Status: SHIPPED | OUTPATIENT
Start: 2018-11-05 | End: 2019-12-31 | Stop reason: SDUPTHER

## 2018-12-03 DIAGNOSIS — J01.00 ACUTE NON-RECURRENT MAXILLARY SINUSITIS: ICD-10-CM

## 2018-12-03 RX ORDER — IBUPROFEN 800 MG/1
TABLET ORAL
Qty: 60 TAB | Refills: 0 | Status: SHIPPED | OUTPATIENT
Start: 2018-12-03 | End: 2019-01-04 | Stop reason: SDUPTHER

## 2018-12-03 RX ORDER — ESCITALOPRAM OXALATE 20 MG/1
TABLET ORAL
Qty: 30 TAB | Refills: 0 | Status: SHIPPED | OUTPATIENT
Start: 2018-12-03 | End: 2019-01-04 | Stop reason: SDUPTHER

## 2019-01-04 ENCOUNTER — OFFICE VISIT (OUTPATIENT)
Dept: FAMILY MEDICINE CLINIC | Age: 49
End: 2019-01-04

## 2019-01-04 VITALS
TEMPERATURE: 98.6 F | RESPIRATION RATE: 18 BRPM | WEIGHT: 293 LBS | DIASTOLIC BLOOD PRESSURE: 94 MMHG | SYSTOLIC BLOOD PRESSURE: 144 MMHG | BODY MASS INDEX: 48.82 KG/M2 | OXYGEN SATURATION: 97 % | HEART RATE: 78 BPM | HEIGHT: 65 IN

## 2019-01-04 DIAGNOSIS — M62.830 MUSCLE SPASM OF BACK: Primary | ICD-10-CM

## 2019-01-04 DIAGNOSIS — E66.01 OBESITY, MORBID (HCC): ICD-10-CM

## 2019-01-04 DIAGNOSIS — F41.1 GENERALIZED ANXIETY DISORDER: ICD-10-CM

## 2019-01-04 DIAGNOSIS — Z12.31 VISIT FOR SCREENING MAMMOGRAM: ICD-10-CM

## 2019-01-04 DIAGNOSIS — J01.00 ACUTE NON-RECURRENT MAXILLARY SINUSITIS: ICD-10-CM

## 2019-01-04 DIAGNOSIS — Z76.0 MEDICATION REFILL: ICD-10-CM

## 2019-01-04 RX ORDER — LORAZEPAM 1 MG/1
1 TABLET ORAL DAILY
Qty: 60 TAB | Refills: 0 | Status: SHIPPED | OUTPATIENT
Start: 2019-01-04

## 2019-01-04 RX ORDER — CYCLOBENZAPRINE HCL 5 MG
5 TABLET ORAL
Qty: 30 TAB | Refills: 0 | Status: SHIPPED | OUTPATIENT
Start: 2019-01-04

## 2019-01-04 RX ORDER — IBUPROFEN 800 MG/1
TABLET ORAL
Qty: 60 TAB | Refills: 0 | Status: SHIPPED | OUTPATIENT
Start: 2019-01-04 | End: 2019-03-27 | Stop reason: SDUPTHER

## 2019-01-04 RX ORDER — FERROUS SULFATE 325(65) MG
325 TABLET, DELAYED RELEASE (ENTERIC COATED) ORAL 2 TIMES DAILY
Qty: 90 TAB | Refills: 3 | Status: SHIPPED | OUTPATIENT
Start: 2019-01-04

## 2019-01-04 RX ORDER — ESCITALOPRAM OXALATE 20 MG/1
TABLET ORAL
Qty: 90 TAB | Refills: 3 | Status: SHIPPED | OUTPATIENT
Start: 2019-01-04 | End: 2019-12-31

## 2019-01-04 NOTE — LETTER
NOTIFICATION RETURN TO WORK / SCHOOL 
 
1/4/2019 9:43 AM 
 
Ms. Lizz Dillard [address] [id number] To Whom It May Concern: 
 
Lizz Dillard is currently under the care of [facility]. Please excuse her for 1/3/19 and 1/4/19 She will return to work/school on: 1/7/19 If there are questions or concerns please have the patient contact our office. Sincerely, Nicci Torres MD

## 2019-01-04 NOTE — PROGRESS NOTES
HISTORY OF PRESENT ILLNESS Regina Cunha is a 53 y.o. female. Patient mentions that she was pulling out the wire to her shira tree and heard a  \"crunch\". She had spasm on the right upper side of back and I have suggested a muscle relaxer at present and heating pad. I have advised her to take ibuprofen twice a day for week. She is also due to have medication refills and mentions other than her back she has been feeling well. She mentions her anxiety has been up due to her muscle spasm. Back Pain The history is provided by the patient. This is a new problem. The current episode started more than 1 week ago. The problem has been gradually worsening. The problem occurs constantly. The pain is associated with twisting. The pain is present in the thoracic spine, lumbar spine and right side. The quality of the pain is described as stabbing, aching, shooting and sharp. The pain is at a severity of 5/10. The pain is moderate. The symptoms are aggravated by bending. The pain is the same all the time. Pertinent negatives include no chest pain, no fever, no numbness, no weight loss, no headaches, no abdominal pain, no bowel incontinence, no perianal numbness, no dysuria, no paresis, no tingling and no weakness. She has tried muscle relaxants for the symptoms. The treatment provided mild relief. Risk factors include obesity. Anxiety The history is provided by the patient. This is a chronic problem. The problem occurs constantly. The problem has been gradually worsening. Pertinent negatives include no chest pain, no abdominal pain, no headaches and no shortness of breath. The symptoms are aggravated by stress. The symptoms are relieved by medications. Treatments tried: currently on meds. Medication Refill Pertinent negatives include no chest pain, no abdominal pain, no headaches and no shortness of breath. Review of Systems Constitutional: Negative for fever, malaise/fatigue and weight loss. HENT: Negative for congestion, ear discharge, ear pain, hearing loss, sinus pain and sore throat. Eyes: Negative for blurred vision, double vision, pain and discharge. Respiratory: Negative for cough, sputum production and shortness of breath. Cardiovascular: Negative for chest pain, palpitations and leg swelling. Gastrointestinal: Negative for abdominal pain, bowel incontinence, constipation, diarrhea and nausea. Genitourinary: Negative for dysuria, hematuria and urgency. Musculoskeletal: Positive for back pain. Skin: Negative for rash. Neurological: Negative for tingling, tremors, focal weakness, weakness, numbness and headaches. Endo/Heme/Allergies: Negative for environmental allergies. Psychiatric/Behavioral: Positive for depression. Negative for hallucinations, substance abuse and suicidal ideas. The patient is nervous/anxious. Visit Vitals BP (!) 144/94 Pulse 78 Temp 98.6 °F (37 °C) (Oral) Resp 18 Ht 5' 5\" (1.651 m) Wt 308 lb (139.7 kg) SpO2 97% BMI 51.25 kg/m² Physical Exam  
Constitutional: She is oriented to person, place, and time. She appears well-developed and well-nourished. No distress. HENT:  
Head: Normocephalic and atraumatic. Right Ear: External ear normal.  
Left Ear: External ear normal.  
Mouth/Throat: Oropharynx is clear and moist. No oropharyngeal exudate. Eyes: EOM are normal. Pupils are equal, round, and reactive to light. No scleral icterus. Neck: Normal range of motion. No thyromegaly present. Cardiovascular: Normal rate, regular rhythm and normal heart sounds. Pulmonary/Chest: Effort normal and breath sounds normal. No respiratory distress. She has no wheezes. Abdominal: Soft. Bowel sounds are normal. She exhibits no distension. There is no tenderness. Musculoskeletal: She exhibits tenderness. Right shoulder: She exhibits tenderness, pain and spasm. Arms: 
Lymphadenopathy:  
  She has no cervical adenopathy. Neurological: She is alert and oriented to person, place, and time. Psychiatric: She has a normal mood and affect. ASSESSMENT and PLAN Muscle spasm of right side of mid back: 
1) Please stop heavy weight lifting and aggressive exercise for a few days to allow healing to damaged nerve roots but it is important to maintain exercises to build strength and flexibility. 2) Consider physical therapy for 6-8 weeks before exploring further treatment options. 3) Ok to use hot /cold packs depending on your body. 4) Discussed the use of pain medication and muscle relaxants , please do not take more than the reccommended amount prescribed. Anxiety : 
 
1) Please do not take more xanax than the reccommended dose, explained side effects and patient verbalized understanding. oral health. Medication refill: 
 Medications requested have been refilled.

## 2019-03-27 DIAGNOSIS — J01.00 ACUTE NON-RECURRENT MAXILLARY SINUSITIS: ICD-10-CM

## 2019-03-27 RX ORDER — IBUPROFEN 800 MG/1
TABLET ORAL
Qty: 60 TAB | Refills: 0 | Status: SHIPPED | OUTPATIENT
Start: 2019-03-27 | End: 2019-05-28 | Stop reason: SDUPTHER

## 2019-05-28 DIAGNOSIS — J01.00 ACUTE NON-RECURRENT MAXILLARY SINUSITIS: ICD-10-CM

## 2019-05-28 RX ORDER — LISINOPRIL 20 MG/1
TABLET ORAL
Qty: 90 TAB | Refills: 1 | Status: SHIPPED | OUTPATIENT
Start: 2019-05-28

## 2019-05-28 RX ORDER — IBUPROFEN 800 MG/1
TABLET ORAL
Qty: 60 TAB | Refills: 0 | Status: SHIPPED | OUTPATIENT
Start: 2019-05-28

## 2019-11-13 ENCOUNTER — TELEPHONE (OUTPATIENT)
Dept: FAMILY MEDICINE CLINIC | Age: 49
End: 2019-11-13

## 2019-11-13 NOTE — TELEPHONE ENCOUNTER
Pt called stating she has a possible ear & sinus infection. She is scheduled for 11/15/19 & added to the cancelation list. She is wondering if medication can can be sent to [facility] in the meeantime as OTC medication is not working.

## 2021-08-30 ENCOUNTER — IMPORTED ENCOUNTER (OUTPATIENT)
Dept: URBAN - METROPOLITAN AREA CLINIC 1 | Facility: CLINIC | Age: 51
End: 2021-08-30

## 2021-08-30 PROBLEM — H52.4: Noted: 2021-08-30

## 2021-08-30 PROBLEM — H52.13: Noted: 2021-08-30

## 2021-08-30 PROCEDURE — S0620 ROUTINE OPHTHALMOLOGICAL EXA: HCPCS

## 2021-08-30 NOTE — PATIENT DISCUSSION
1. Myopia: Rx was given for correction if indicated and requested. 2. Presbyopia3. HARLEEN w/ PEK OU - Recommend ATs BID OU routinely. (sample of Blink given)Return for an appointment in 1 year for 40 with Dr. Pretty López.

## 2022-03-19 PROBLEM — E66.01 OBESITY, MORBID (HCC): Status: ACTIVE | Noted: 2019-01-04

## 2022-04-02 ASSESSMENT — VISUAL ACUITY
OS_CC: J1
OS_CC: 20/40
OD_CC: 20/25
OD_SC: 20/20
OD_CC: J1
OS_SC: 20/20-1

## 2022-04-02 ASSESSMENT — TONOMETRY
OS_IOP_MMHG: 13
OD_IOP_MMHG: 13

## 2023-01-31 RX ORDER — IBUPROFEN 800 MG/1
TABLET ORAL
COMMUNITY
Start: 2019-05-28

## 2023-01-31 RX ORDER — LANOLIN ALCOHOL/MO/W.PET/CERES
325 CREAM (GRAM) TOPICAL 2 TIMES DAILY
COMMUNITY
Start: 2016-12-22

## 2023-01-31 RX ORDER — METOPROLOL TARTRATE 50 MG/1
50 TABLET, FILM COATED ORAL DAILY
COMMUNITY
Start: 2018-03-07

## 2023-01-31 RX ORDER — ESCITALOPRAM OXALATE 20 MG/1
TABLET ORAL
COMMUNITY
Start: 2019-12-31

## 2023-01-31 RX ORDER — LISINOPRIL 20 MG/1
TABLET ORAL
COMMUNITY
Start: 2019-05-28

## 2023-01-31 RX ORDER — CYCLOBENZAPRINE HCL 5 MG
5 TABLET ORAL
COMMUNITY
Start: 2019-01-04

## 2023-01-31 RX ORDER — LORAZEPAM 1 MG/1
1 TABLET ORAL DAILY
COMMUNITY
Start: 2019-01-04

## 2024-08-08 ENCOUNTER — COMPREHENSIVE EXAM (OUTPATIENT)
Dept: URBAN - METROPOLITAN AREA CLINIC 1 | Facility: CLINIC | Age: 54
End: 2024-08-08

## 2024-08-08 DIAGNOSIS — H52.4: ICD-10-CM

## 2024-08-08 DIAGNOSIS — H52.13: ICD-10-CM

## 2024-08-08 DIAGNOSIS — H52.223: ICD-10-CM

## 2024-08-08 DIAGNOSIS — Z01.00: ICD-10-CM

## 2024-08-08 PROCEDURE — 92014 COMPRE OPH EXAM EST PT 1/>: CPT

## 2024-08-08 PROCEDURE — 92015 DETERMINE REFRACTIVE STATE: CPT

## 2024-08-08 ASSESSMENT — VISUAL ACUITY
OS_CC: 20/25-2
OD_CC: 20/20-1
OD_CC: J1+
OS_CC: J1

## 2024-08-08 ASSESSMENT — TONOMETRY
OS_IOP_MMHG: 10
OD_IOP_MMHG: 11